# Patient Record
Sex: FEMALE | Race: WHITE | NOT HISPANIC OR LATINO | URBAN - METROPOLITAN AREA
[De-identification: names, ages, dates, MRNs, and addresses within clinical notes are randomized per-mention and may not be internally consistent; named-entity substitution may affect disease eponyms.]

---

## 2017-07-09 ENCOUNTER — EMERGENCY (EMERGENCY)
Facility: HOSPITAL | Age: 42
LOS: 0 days | Discharge: HOME | End: 2017-07-09
Attending: EMERGENCY MEDICINE
Payer: COMMERCIAL

## 2017-07-09 VITALS
OXYGEN SATURATION: 99 % | DIASTOLIC BLOOD PRESSURE: 72 MMHG | RESPIRATION RATE: 16 BRPM | TEMPERATURE: 98 F | SYSTOLIC BLOOD PRESSURE: 122 MMHG | WEIGHT: 160.06 LBS | HEIGHT: 66 IN | HEART RATE: 74 BPM

## 2017-07-09 DIAGNOSIS — Z88.8 ALLERGY STATUS TO OTHER DRUGS, MEDICAMENTS AND BIOLOGICAL SUBSTANCES STATUS: ICD-10-CM

## 2017-07-09 DIAGNOSIS — L03.011 CELLULITIS OF RIGHT FINGER: ICD-10-CM

## 2017-07-09 DIAGNOSIS — M79.644 PAIN IN RIGHT FINGER(S): ICD-10-CM

## 2017-07-09 DIAGNOSIS — N60.19 DIFFUSE CYSTIC MASTOPATHY OF UNSPECIFIED BREAST: ICD-10-CM

## 2017-07-09 DIAGNOSIS — F41.9 ANXIETY DISORDER, UNSPECIFIED: ICD-10-CM

## 2017-07-09 PROCEDURE — 99284 EMERGENCY DEPT VISIT MOD MDM: CPT

## 2017-07-09 PROCEDURE — 73140 X-RAY EXAM OF FINGER(S): CPT | Mod: 26,RT

## 2017-07-09 RX ORDER — METRONIDAZOLE 500 MG
1 TABLET ORAL
Qty: 21 | Refills: 0 | OUTPATIENT
Start: 2017-07-09 | End: 2017-07-16

## 2017-07-09 RX ORDER — METRONIDAZOLE 500 MG
500 TABLET ORAL ONCE
Qty: 0 | Refills: 0 | Status: COMPLETED | OUTPATIENT
Start: 2017-07-09 | End: 2017-07-09

## 2017-07-09 RX ADMIN — Medication 300 MILLIGRAM(S): at 14:05

## 2017-07-09 RX ADMIN — Medication 500 MILLIGRAM(S): at 14:04

## 2017-07-09 NOTE — ED PROVIDER NOTE - MEDICAL DECISION MAKING DETAILS
awaiting  Dr. Hauser saw patient in ED otherwise will start Abx for early cellulitis  and given abx for treatment and care.

## 2017-07-09 NOTE — ED PROVIDER NOTE - MUSCULOSKELETAL, MLM
Spine appears normal, range of motion is not limited, R thumb with redness of area just distal to DIP joint, no noted drainable pocket.

## 2017-07-09 NOTE — ED PROVIDER NOTE - OBJECTIVE STATEMENT
42 year old female with PMH of anxiety, recent diagnosis of melanoma stage 0, excised presenting to ED due to R thumb pain/redness with swelling x 3 days, otherwise initially there was swelling of pulp of thumb, happened after digging in dirt with bare hands, states otherwise had a plastics doctor initially look at the swollen area of thumb and found not to have any splinter when pt thought there was potential foreign body in thumb, however none was found 42 year old female with PMH of anxiety, recent diagnosis of melanoma stage 0, excised presenting to ED due to R thumb pain/redness with swelling x 6-7 days, otherwise initially there was swelling of pulp of thumb, happened after digging in dirt with bare hands, states otherwise had a plastics doctor initially look at the swollen area of thumb and found not to have any splinter when pt thought there was potential foreign body in thumb, however none was found

## 2017-07-09 NOTE — CONSULT NOTE ADULT - SUBJECTIVE AND OBJECTIVE BOX
CC:  42 year old female with pain redness and swelling of right thumb    HPI:  Patient is RHD on 17 she was gardening without gloves.  On 17 night she was awoken by pain in right thumb.  She noted pain and swelling. Pt. incidentally was being followed by plastic surgeon Dr. Moran.  Pt. states he used loupes to explore the pulp area where the redness and swelling  was noted.  She states no F.B. was seen and drainage was bloody.  Pt. had increase her daily doxycycline from 50 mg daily to 100mg at some point.  Since Yesterday noted redness, pain and swelling on the dorsum of the thumb and the volar tip is better.    PMH:  stage 0 melanoma right leg (being treated by Dr. Moran), anxiety, acne vulgaris    PSH:      Allergies:  NKDA    Meds:  doxy, other abx to be added    SH:  nonsmoker    FH:  noncontributory    ROS:  Denies fever, chills, nausea, vomitting, headache    PE: Afebrile, Stable vital signs    right thump close pinpoint puncture volar soft tissue with no drainage.  redness dorsally just distal to IP joint and proximal to proximal nail fold, mild edema, suggestion of small .5 cm blister on radial and ulnar sides of thumb in area with erythema.  Tenderness in the soft tissue with flexion of IP joint.  Thumb otherwise neurovascularly intact.    xray:  negative for F.B. or fracture

## 2017-07-09 NOTE — CONSULT NOTE ADULT - PROBLEM SELECTOR RECOMMENDATION 9
options to rafael blister to obtain specimen for culture vs continuing to monitor with change of abx to include anaerobic and gram negative culture.  for now going with abx change and continuing soaks and warm compress.  Patient to return to ER, followup Dr. Moran, or Dr. Hauser for worsening symptoms

## 2018-11-28 ENCOUNTER — APPOINTMENT (OUTPATIENT)
Dept: ORTHOPEDIC SURGERY | Facility: CLINIC | Age: 43
End: 2018-11-28
Payer: COMMERCIAL

## 2018-11-28 VITALS
SYSTOLIC BLOOD PRESSURE: 133 MMHG | HEIGHT: 66 IN | WEIGHT: 170 LBS | HEART RATE: 71 BPM | BODY MASS INDEX: 27.32 KG/M2 | DIASTOLIC BLOOD PRESSURE: 80 MMHG

## 2018-11-28 DIAGNOSIS — M23.91 UNSPECIFIED INTERNAL DERANGEMENT OF RIGHT KNEE: ICD-10-CM

## 2018-11-28 DIAGNOSIS — Z98.890 OTHER SPECIFIED POSTPROCEDURAL STATES: ICD-10-CM

## 2018-11-28 DIAGNOSIS — Z78.9 OTHER SPECIFIED HEALTH STATUS: ICD-10-CM

## 2018-11-28 DIAGNOSIS — M22.2X2 PATELLOFEMORAL DISORDERS, LEFT KNEE: ICD-10-CM

## 2018-11-28 DIAGNOSIS — Z87.891 PERSONAL HISTORY OF NICOTINE DEPENDENCE: ICD-10-CM

## 2018-11-28 PROCEDURE — 73564 X-RAY EXAM KNEE 4 OR MORE: CPT | Mod: LT

## 2018-11-28 PROCEDURE — 99204 OFFICE O/P NEW MOD 45 MIN: CPT

## 2018-11-28 RX ORDER — DICLOFENAC SODIUM 50 MG/1
50 TABLET, DELAYED RELEASE ORAL
Qty: 60 | Refills: 1 | Status: ACTIVE | COMMUNITY
Start: 2018-11-28 | End: 1900-01-01

## 2020-11-23 ENCOUNTER — TRANSCRIPTION ENCOUNTER (OUTPATIENT)
Age: 45
End: 2020-11-23

## 2024-03-05 ENCOUNTER — APPOINTMENT (OUTPATIENT)
Dept: VASCULAR SURGERY | Facility: CLINIC | Age: 49
End: 2024-03-05

## 2024-03-11 ENCOUNTER — APPOINTMENT (OUTPATIENT)
Dept: VASCULAR SURGERY | Facility: CLINIC | Age: 49
End: 2024-03-11

## 2024-03-11 ENCOUNTER — APPOINTMENT (OUTPATIENT)
Dept: VASCULAR SURGERY | Facility: CLINIC | Age: 49
End: 2024-03-11
Payer: COMMERCIAL

## 2024-03-11 VITALS
BODY MASS INDEX: 27 KG/M2 | HEIGHT: 66 IN | SYSTOLIC BLOOD PRESSURE: 146 MMHG | WEIGHT: 168 LBS | TEMPERATURE: 97.7 F | DIASTOLIC BLOOD PRESSURE: 80 MMHG | HEART RATE: 82 BPM

## 2024-03-11 VITALS — DIASTOLIC BLOOD PRESSURE: 82 MMHG | HEART RATE: 79 BPM | SYSTOLIC BLOOD PRESSURE: 124 MMHG

## 2024-03-11 PROCEDURE — 99203 OFFICE O/P NEW LOW 30 MIN: CPT

## 2024-03-11 PROCEDURE — 93971 EXTREMITY STUDY: CPT | Mod: RT

## 2024-09-13 ENCOUNTER — APPOINTMENT (OUTPATIENT)
Dept: VASCULAR SURGERY | Facility: CLINIC | Age: 49
End: 2024-09-13